# Patient Record
Sex: FEMALE
[De-identification: names, ages, dates, MRNs, and addresses within clinical notes are randomized per-mention and may not be internally consistent; named-entity substitution may affect disease eponyms.]

---

## 2018-10-16 ENCOUNTER — HOSPITAL ENCOUNTER (OUTPATIENT)
Dept: HOSPITAL 89 - OR | Age: 83
Discharge: HOME | End: 2018-10-16
Attending: ORTHOPAEDIC SURGERY
Payer: MEDICARE

## 2018-10-16 VITALS — SYSTOLIC BLOOD PRESSURE: 159 MMHG | DIASTOLIC BLOOD PRESSURE: 73 MMHG

## 2018-10-16 VITALS — SYSTOLIC BLOOD PRESSURE: 171 MMHG | DIASTOLIC BLOOD PRESSURE: 81 MMHG

## 2018-10-16 VITALS — SYSTOLIC BLOOD PRESSURE: 138 MMHG | DIASTOLIC BLOOD PRESSURE: 68 MMHG

## 2018-10-16 VITALS — DIASTOLIC BLOOD PRESSURE: 76 MMHG | SYSTOLIC BLOOD PRESSURE: 146 MMHG

## 2018-10-16 VITALS — BODY MASS INDEX: 29.64 KG/M2 | HEIGHT: 60 IN | WEIGHT: 151 LBS

## 2018-10-16 VITALS — DIASTOLIC BLOOD PRESSURE: 52 MMHG | SYSTOLIC BLOOD PRESSURE: 105 MMHG

## 2018-10-16 VITALS — DIASTOLIC BLOOD PRESSURE: 57 MMHG | SYSTOLIC BLOOD PRESSURE: 128 MMHG

## 2018-10-16 VITALS — DIASTOLIC BLOOD PRESSURE: 65 MMHG | SYSTOLIC BLOOD PRESSURE: 131 MMHG

## 2018-10-16 DIAGNOSIS — Z47.2: Primary | ICD-10-CM

## 2018-10-16 PROCEDURE — 20680 REMOVAL OF IMPLANT DEEP: CPT

## 2018-10-16 PROCEDURE — 76000 FLUOROSCOPY <1 HR PHYS/QHP: CPT

## 2018-10-16 NOTE — RADIOLOGY IMAGING REPORT
FACILITY: [facility] - [place] 

 

PATIENT NAME: Genie Read

: 1935

MR: [id number]

V: [id number]

EXAM DATE: 

ORDERING PHYSICIAN: KELVIN NAGEL

TECHNOLOGIST: 

 

[place] [facility] [place]: Genie Read

: 1935

MRN: [id number]

Visit/Account:[id number]

Date of Sevice: 10/16/2018

 

ACCESSION #: [id number]

 

Exam type: C-ARM FLUORO 1 HR

 

History: HARDWARE REMOVAL RIGHT HIP

 

Comparison: None.

 

Findings:

 

Fluoroscopy was used for the purposes of removal of right hip hardware.  Total fluoroscopy time is 29
.2 seconds for a total DAP of 0.70632 mGy/m2.

 

The provided images demonstrate a surgical screw removed from the right hip.

 

IMPRESSION:

 

1.  Fluoroscopy was used for the purposes of removal of hardware from the right hip.

 

Report Dictated By: Thomas Dunphy, MD at 10/16/2018 12:37 PM

 

Report E-Signed By: Thomas Dunphy, MD  at 10/16/2018 12:38 PM

 

WSN:CANDIS